# Patient Record
Sex: FEMALE | URBAN - METROPOLITAN AREA
[De-identification: names, ages, dates, MRNs, and addresses within clinical notes are randomized per-mention and may not be internally consistent; named-entity substitution may affect disease eponyms.]

---

## 2020-10-21 ENCOUNTER — OFFICE VISIT (OUTPATIENT)
Dept: URBAN - METROPOLITAN AREA CLINIC 27 | Facility: CLINIC | Age: 65
End: 2020-10-21
Payer: MEDICARE

## 2020-10-21 DIAGNOSIS — Z96.1 PRESENCE OF INTRAOCULAR LENS: ICD-10-CM

## 2020-10-21 PROCEDURE — 92134 CPTRZ OPH DX IMG PST SGM RTA: CPT | Performed by: OPHTHALMOLOGY

## 2020-10-21 PROCEDURE — 92014 COMPRE OPH EXAM EST PT 1/>: CPT | Performed by: OPHTHALMOLOGY

## 2020-10-21 ASSESSMENT — INTRAOCULAR PRESSURE
OD: 15
OS: 19

## 2020-10-21 NOTE — IMPRESSION/PLAN
Impression: Macular degeneration wet, OD. Status: Symptomatic.
s/p Avastin x 11 last 09/02/2020 (consented 03/06/2020) Plan: Exam and OCT reveal SRF and a PED. An IVFA from 09/02/2020 demonstrated leakage. Fundus photos from 09/02/2020 demonstrated drusen. An ICGA from 05/29/2020 demonstrated no polyps. Recommend Avastin. R/B/A discussed with patient. The risks of Avastin were discussed, including off-label use and compounding pharmacy risks, and the patient elects to proceed with Avastin (bilateral). The risks of bilateral injections were discussed, and the patient elects to proceed. After 2% Subconjunctival anesthesia & Betadine prep, 1.25mg of Avastin was injected in the eye. Cold compress and Tylenol suggested for pain if needed. Thanks, Bibi Richards. 

Return in 1 month OCT OU re eval Nv AMD.

## 2020-10-21 NOTE — IMPRESSION/PLAN
Impression: Macular degeneration wet, OS. Status: Symptomatic.
s/p Avastin x 11 last 09/02/2020 Plan: Exam and OCT reveal IRF and a PED OS. An IVFA from 09/02/2020 demonstrated leakage. Fundus photos from 09/02/2020 demonstrated drusen. An ICGA from 05/29/2020 demonstrated a polyp. Recommend Avastin. R/B/A discussed with patient. The risks of Avastin were discussed, including off-label use and compounding pharmacy risks, and the patient elects to proceed with Avastin (bilateral).

## 2020-11-18 ENCOUNTER — OFFICE VISIT (OUTPATIENT)
Dept: URBAN - METROPOLITAN AREA CLINIC 27 | Facility: CLINIC | Age: 65
End: 2020-11-18
Payer: MEDICARE

## 2020-11-18 DIAGNOSIS — H04.123 DRY EYE SYNDROME OF BILATERAL LACRIMAL GLANDS: ICD-10-CM

## 2020-11-18 PROCEDURE — 92014 COMPRE OPH EXAM EST PT 1/>: CPT | Performed by: OPHTHALMOLOGY

## 2020-11-18 PROCEDURE — 92134 CPTRZ OPH DX IMG PST SGM RTA: CPT | Performed by: OPHTHALMOLOGY

## 2020-11-18 ASSESSMENT — INTRAOCULAR PRESSURE
OD: 15
OS: 16

## 2020-11-18 NOTE — IMPRESSION/PLAN
Impression: Macular degeneration wet, OS. Status: Symptomatic.
s/p Avastin x 12 last 10/21/2020 Plan: Exam and OCT reveal IRF and a PED OS. An IVFA from 09/02/2020 demonstrated leakage. Fundus photos from 09/02/2020 demonstrated drusen. An ICGA from 05/29/2020 demonstrated a polyp. Recommend Avastin. R/B/A discussed with patient. The risks of Avastin were discussed, including off-label use and compounding pharmacy risks, and the patient elects to proceed with Avastin (bilateral).

## 2020-11-18 NOTE — IMPRESSION/PLAN
Impression: Macular degeneration wet, OD. Status: Symptomatic.
s/p Avastin x 12 last 10/21/2020 (consented 03/06/2020) Plan: The patient notes distortion. Exam and OCT reveal SRF and a PED. An IVFA from 09/02/2020 demonstrated leakage. Fundus photos from 09/02/2020 demonstrated drusen. An ICGA from 05/29/2020 demonstrated no polyps. Recommend Avastin. R/B/A discussed with patient. The risks of Avastin were discussed, including off-label use and compounding pharmacy risks, and the patient elects to proceed with Avastin (bilateral). The risks of bilateral injections were discussed, and the patient elects to proceed. After 2% Subconjunctival anesthesia & Betadine prep, 1.25mg of Avastin was injected in the eye. Cold compress and Tylenol suggested for pain if needed. Thanks, Davy Lancaster. 

Return in 1 month OCT OU re eval Nv AMD.

## 2021-01-22 ENCOUNTER — OFFICE VISIT (OUTPATIENT)
Dept: URBAN - METROPOLITAN AREA CLINIC 27 | Facility: CLINIC | Age: 66
End: 2021-01-22
Payer: MEDICARE

## 2021-01-22 PROCEDURE — 92014 COMPRE OPH EXAM EST PT 1/>: CPT | Performed by: OPHTHALMOLOGY

## 2021-01-22 PROCEDURE — 67028 INJECTION EYE DRUG: CPT | Performed by: OPHTHALMOLOGY

## 2021-01-22 PROCEDURE — 92134 CPTRZ OPH DX IMG PST SGM RTA: CPT | Performed by: OPHTHALMOLOGY

## 2021-01-22 ASSESSMENT — INTRAOCULAR PRESSURE
OS: 16
OD: 19

## 2021-01-22 NOTE — IMPRESSION/PLAN
Impression: Macular degeneration wet, OS. Status: Symptomatic.
s/p Avastin x 13 last 11/18/2020 Plan: Exam and OCT reveal IRF and a PED OS. An IVFA from 09/02/2020 demonstrated leakage. Fundus photos from 09/02/2020 demonstrated drusen. An ICGA from 05/29/2020 demonstrated a polyp. Recommend Avastin. R/B/A discussed with patient. The risks of Avastin were discussed, including off-label use and compounding pharmacy risks, and the patient elects to proceed with Avastin (bilateral).

## 2021-01-22 NOTE — IMPRESSION/PLAN
Impression: Macular degeneration wet, OD. Status: Symptomatic.
s/p Avastin x 13 last 11/18/2020 (consented 03/06/2020) Plan: The patient notes distortion. Exam and OCT reveal SRF and a PED. An IVFA from 09/02/2020 demonstrated leakage. Fundus photos from 09/02/2020 demonstrated drusen. An ICGA from 05/29/2020 demonstrated no polyps. Recommend Avastin. R/B/A discussed with patient. The risks of Avastin were discussed, including off-label use and compounding pharmacy risks, and the patient elects to proceed with Avastin (bilateral). The risks of bilateral injections were discussed, and the patient elects to proceed. After 2% Subconjunctival anesthesia & Betadine prep, 1.25mg of Avastin was injected in the eye. Cold compress and Tylenol suggested for pain if needed. Thanks, Katharine Shultz. 

Return in 1 month OCT OU re eval Nv AMD, possible Lucentis OU Albin Arrieta)

## 2021-03-31 ENCOUNTER — OFFICE VISIT (OUTPATIENT)
Dept: URBAN - METROPOLITAN AREA CLINIC 27 | Facility: CLINIC | Age: 66
End: 2021-03-31
Payer: MEDICARE

## 2021-03-31 PROCEDURE — 99214 OFFICE O/P EST MOD 30 MIN: CPT | Performed by: OPHTHALMOLOGY

## 2021-03-31 PROCEDURE — 92134 CPTRZ OPH DX IMG PST SGM RTA: CPT | Performed by: OPHTHALMOLOGY

## 2021-03-31 ASSESSMENT — INTRAOCULAR PRESSURE
OD: 16
OS: 17

## 2021-03-31 NOTE — IMPRESSION/PLAN
Impression: Macular degeneration wet, OD. Status: Symptomatic.
s/p Avastin x 14 last 01/22/2021 (consented 03/06/2020) Plan: The patient notes worsening distortion. Exam and OCT reveal SRF and a PED. An IVFA from 09/02/2020 demonstrated leakage. Fundus photos from 09/02/2020 demonstrated drusen. An ICGA from 05/29/2020 demonstrated no polyps. Recommend Lucentis. R/B/A discussed with patient. Patient elects to proceed with Lucentis 0.5mg (bilateral) today. The risks of bilateral injections were discussed, and the patient elects to proceed. After 2% Subconjunctival anesthesia & betadine prep, Lucentis was injected in the eye with no complications. Overfill discarded. Cold compress and Tylenol suggested for pain if needed. 

Return in 1 month OCT OU re eval Nv AMD, possible Lucentis OU, ICG OD 1st

## 2021-03-31 NOTE — IMPRESSION/PLAN
Impression: Macular degeneration wet, OS. Status: Symptomatic.
s/p Avastin x 14 last 01/22/2021  Plan: Exam and OCT reveal IRF and a PED OS. An IVFA from 09/02/2020 demonstrated leakage. Fundus photos from 09/02/2020 demonstrated drusen. An ICGA from 05/29/2020 demonstrated a polyp. Recommend Lucentis. R/B/A discussed with patient. Patient elects to proceed with Lucentis 0.5mg today. After 2% Subconjunctival anesthesia & betadine prep, Lucentis was injected in the eye with no complications. Remainder discarded. Cold compress and Tylenol suggested for pain if needed.

## 2021-04-28 ENCOUNTER — OFFICE VISIT (OUTPATIENT)
Dept: URBAN - METROPOLITAN AREA CLINIC 27 | Facility: CLINIC | Age: 66
End: 2021-04-28
Payer: MEDICARE

## 2021-04-28 DIAGNOSIS — H35.3231 EXUDATIVE AGE-REL MCLR DEGN, BI, WITH ACTV CHRDL NEOVAS: Primary | ICD-10-CM

## 2021-04-28 PROCEDURE — 92134 CPTRZ OPH DX IMG PST SGM RTA: CPT | Performed by: OPHTHALMOLOGY

## 2021-04-28 PROCEDURE — 92240 ICG ANGIOGRAPHY I&R UNI/BI: CPT | Performed by: OPHTHALMOLOGY

## 2021-04-28 PROCEDURE — 99214 OFFICE O/P EST MOD 30 MIN: CPT | Performed by: OPHTHALMOLOGY

## 2021-04-28 ASSESSMENT — INTRAOCULAR PRESSURE
OS: 21
OD: 19

## 2021-04-28 NOTE — IMPRESSION/PLAN
Impression: Macular degeneration wet, OD. Status: Symptomatic.
s/p Avastin x 14 last 01/22/2021 (consented 03/06/2020)
s/p Lucentis x last 03/31/2021  Plan: Exam and OCT reveal SRF and a PED. An IVFA from 09/02/2020 demonstrated leakage. Fundus photos from 04/28/2021 demonstrated drusen. An ICGA from 04/28/2021 demonstrated no polyps. Recommend Lucentis. R/B/A discussed with patient. Patient elects to proceed with Lucentis 0.5mg (bilateral) today. The risks of bilateral injections were discussed, and the patient elects to proceed. After 2% Subconjunctival anesthesia & betadine prep, Lucentis was injected in the eye with no complications. Overfill discarded. Cold compress and Tylenol suggested for pain if needed. 

Return in 1 month OCT OU re eval Nv AMD, possible Lucentis OU, IVFA OD 1st

## 2021-04-28 NOTE — IMPRESSION/PLAN
Impression: Macular degeneration wet, OS. Status: Symptomatic.
s/p Avastin x 14 last 01/22/2021
s/p Lucentis x last 03/31/2021  Plan: Exam and OCT reveal IRF and a PED OS. An IVFA from 09/02/2020 demonstrated leakage. Fundus photos from 04/28/2021 demonstrated drusen. An ICGA from 04/28/2021 demonstrated a polyp. Recommend Lucentis. R/B/A discussed with patient. Patient elects to proceed with Lucentis 0.5mg today. After 2% Subconjunctival anesthesia & betadine prep, Lucentis was injected in the eye with no complications. Remainder discarded. Cold compress and Tylenol suggested for pain if needed.

## 2021-05-28 ENCOUNTER — OFFICE VISIT (OUTPATIENT)
Dept: URBAN - METROPOLITAN AREA CLINIC 27 | Facility: CLINIC | Age: 66
End: 2021-05-28
Payer: MEDICARE

## 2021-05-28 DIAGNOSIS — H43.813 VITREOUS DEGENERATION, BILATERAL: ICD-10-CM

## 2021-05-28 PROCEDURE — 99214 OFFICE O/P EST MOD 30 MIN: CPT | Performed by: OPHTHALMOLOGY

## 2021-05-28 PROCEDURE — 92235 FLUORESCEIN ANGRPH MLTIFRAME: CPT | Performed by: OPHTHALMOLOGY

## 2021-05-28 PROCEDURE — 92134 CPTRZ OPH DX IMG PST SGM RTA: CPT | Performed by: OPHTHALMOLOGY

## 2021-05-28 ASSESSMENT — INTRAOCULAR PRESSURE
OD: 17
OS: 22

## 2021-05-28 NOTE — IMPRESSION/PLAN
Impression: Macular degeneration wet, OD. Status: Symptomatic.
s/p Avastin x 14 last 01/22/2021 
s/p Lucentis x last 04/28/2021  Plan: The patient notes blurring. Exam and OCT reveal SRF and a PED. An IVFA from 05/28/2021 demonstrated leakage. Fundus photos from 05/28/2021 demonstrated drusen. An ICGA from 04/28/2021 demonstrated no polyps. Recommend Lucentis. R/B/A discussed with patient. Patient elects to proceed with Lucentis 0.5mg (bilateral) today. The risks of bilateral injections were discussed, and the patient elects to proceed. After 2% Subconjunctival anesthesia & betadine prep, Lucentis was injected in the eye with no complications. Overfill discarded. Cold compress and Tylenol suggested for pain if needed. 

Return in 1 month OCT OU re eval Nv AMD, possible Lucentis OU

## 2021-05-28 NOTE — IMPRESSION/PLAN
Impression: Macular degeneration wet, OS. Status: Symptomatic.
s/p Avastin x 14 last 01/22/2021
s/p Lucentis x last 04/28/2021  Plan: Exam and OCT reveal IRF and a PED OS. An IVFA from 05/28/2021 demonstrated leakage. Fundus photos from 05/28/2021 demonstrated drusen. An ICGA from 04/28/2021 demonstrated a polyp. Recommend Lucentis. R/B/A discussed with patient. Patient elects to proceed with Lucentis 0.5mg today. After 2% Subconjunctival anesthesia & betadine prep, Lucentis was injected in the eye with no complications. Remainder discarded. Cold compress and Tylenol suggested for pain if needed.

## 2021-08-04 ENCOUNTER — OFFICE VISIT (OUTPATIENT)
Dept: URBAN - METROPOLITAN AREA CLINIC 27 | Facility: CLINIC | Age: 66
End: 2021-08-04
Payer: MEDICARE

## 2021-08-04 PROCEDURE — 92014 COMPRE OPH EXAM EST PT 1/>: CPT | Performed by: OPHTHALMOLOGY

## 2021-08-04 PROCEDURE — 92134 CPTRZ OPH DX IMG PST SGM RTA: CPT | Performed by: OPHTHALMOLOGY

## 2021-08-04 ASSESSMENT — INTRAOCULAR PRESSURE
OS: 17
OD: 18

## 2021-08-04 NOTE — IMPRESSION/PLAN
Impression: Macular degeneration wet, OD. Status: Symptomatic.
s/p Avastin x 14 last 01/22/2021 
s/p Lucentis x last 05/28/2021 Plan: The patient notes continued blurring. Exam and OCT reveal SRF and a PED. An IVFA from 05/28/2021 demonstrated leakage. Fundus photos from 05/28/2021 demonstrated drusen. An ICGA from 04/28/2021 demonstrated no polyps. Recommend Lucentis. R/B/A discussed with patient. Patient elects to proceed with Lucentis 0.5mg (bilateral) today. The risks of bilateral injections were discussed, and the patient elects to proceed. After 2% Subconjunctival anesthesia & betadine prep, Lucentis was injected in the eye with no complications. Overfill discarded. Cold compress and Tylenol suggested for pain if needed. 

Return in 1 month OCT OU re eval Nv AMD, possible Lucentis OU

## 2021-08-04 NOTE — IMPRESSION/PLAN
Impression: Macular degeneration wet, OS. Status: Symptomatic.
s/p Avastin x 14 last 01/22/2021
s/p Lucentis x last 05/28/2021 Plan: Exam and OCT reveal IRF and a PED OS. An IVFA from 05/28/2021 demonstrated leakage. Fundus photos from 05/28/2021 demonstrated drusen. An ICGA from 04/28/2021 demonstrated a polyp. Recommend Lucentis. R/B/A discussed with patient. Patient elects to proceed with Lucentis 0.5mg today. After 2% Subconjunctival anesthesia & betadine prep, Lucentis was injected in the eye with no complications. Remainder discarded. Cold compress and Tylenol suggested for pain if needed.

## 2021-09-17 ENCOUNTER — OFFICE VISIT (OUTPATIENT)
Dept: URBAN - METROPOLITAN AREA CLINIC 27 | Facility: CLINIC | Age: 66
End: 2021-09-17
Payer: MEDICARE

## 2021-09-17 PROCEDURE — 92014 COMPRE OPH EXAM EST PT 1/>: CPT | Performed by: OPHTHALMOLOGY

## 2021-09-17 PROCEDURE — 92134 CPTRZ OPH DX IMG PST SGM RTA: CPT | Performed by: OPHTHALMOLOGY

## 2021-09-17 ASSESSMENT — INTRAOCULAR PRESSURE
OD: 22
OS: 20

## 2021-09-17 NOTE — IMPRESSION/PLAN
Impression: Macular degeneration wet, OS. Status: Symptomatic.
s/p Avastin x 14 last 01/22/2021
s/p Lucentis x last 08/04/2021  Plan: Exam and OCT reveal IRF and a PED OS. An IVFA from 05/28/2021 demonstrated leakage. Fundus photos from 05/28/2021 demonstrated drusen. An ICGA from 04/28/2021 demonstrated a polyp. Recommend Lucentis. R/B/A discussed with patient. Patient elects to proceed with Lucentis 0.5mg today. After 2% Subconjunctival anesthesia & betadine prep, Lucentis was injected in the eye with no complications. Remainder discarded. Cold compress and Tylenol suggested for pain if needed.

## 2021-09-17 NOTE — IMPRESSION/PLAN
Impression: Macular degeneration wet, OD. Status: Symptomatic.
s/p Avastin x 14 last 01/22/2021 
s/p Lucentis x last 08/04/2021  Plan: Exam and OCT reveal SRF and a PED. An IVFA from 05/28/2021 demonstrated leakage. Fundus photos from 05/28/2021 demonstrated drusen. An ICGA from 04/28/2021 demonstrated no polyps. Recommend Lucentis. R/B/A discussed with patient. Patient elects to proceed with Lucentis 0.5mg (bilateral) today. The risks of bilateral injections were discussed, and the patient elects to proceed. After 2% Subconjunctival anesthesia & betadine prep, Lucentis was injected in the eye with no complications. Overfill discarded. Cold compress and Tylenol suggested for pain if needed. Will consider Avastin. 

Return in 1 month OCT OU re eval Nv AMD, possible Avastin OU Moises Nichole)

## 2021-12-08 ENCOUNTER — OFFICE VISIT (OUTPATIENT)
Dept: URBAN - METROPOLITAN AREA CLINIC 27 | Facility: CLINIC | Age: 66
End: 2021-12-08
Payer: MEDICARE

## 2021-12-08 PROCEDURE — 92134 CPTRZ OPH DX IMG PST SGM RTA: CPT | Performed by: OPHTHALMOLOGY

## 2021-12-08 PROCEDURE — 92014 COMPRE OPH EXAM EST PT 1/>: CPT | Performed by: OPHTHALMOLOGY

## 2021-12-08 ASSESSMENT — INTRAOCULAR PRESSURE
OS: 21
OD: 20

## 2021-12-08 NOTE — IMPRESSION/PLAN
Impression: Macular degeneration wet, OS. Status: Symptomatic.
s/p Avastin x 14 last 01/22/2021
s/p Lucentis x last 09/17/2021 Plan: Exam and OCT reveal IRF and a PED OS. An IVFA from 05/28/2021 demonstrated leakage. Fundus photos from 05/28/2021 demonstrated drusen. An ICGA from 04/28/2021 demonstrated a polyp. Recommend Avastin. R/B/A discussed with patient. The risks of Avastin were discussed, including off-label use and compounding pharmacy risks, and the patient elects to proceed with Avastin (bilateral). The risks of bilateral injections were discussed, and the patient elects to proceed. After 2% Subconjunctival anesthesia & betadine prep, 1.25mg of Avastin was injected in the eye. Cold compress and Tylenol suggested for pain if needed.

## 2021-12-08 NOTE — IMPRESSION/PLAN
Impression: Macular degeneration wet, OD. Status: Symptomatic.
s/p Avastin x 14 last 01/22/2021 
s/p Lucentis x last 09/17/2021 Plan: The patient notes blurring. Exam and OCT reveal SRF and a PED. An IVFA from 05/28/2021 demonstrated leakage. Fundus photos from 05/28/2021 demonstrated drusen. An ICGA from 04/28/2021 demonstrated no polyps. Recommend Avastin. R/B/A discussed with patient. The risks of Avastin were discussed, including off-label use and compounding pharmacy risks, and the patient elects to proceed with Avastin (bilateral). The risks of bilateral injections were discussed, and the patient elects to proceed. After 2% Subconjunctival anesthesia & betadine prep, 1.25mg of Avastin was injected in the eye. Cold compress and Tylenol suggested for pain if needed. 


Return in 1 month OCT OU re eval Nv AMD, possible Avastin OU, ICG OD 1st

## 2022-01-19 ENCOUNTER — OFFICE VISIT (OUTPATIENT)
Dept: URBAN - METROPOLITAN AREA CLINIC 27 | Facility: CLINIC | Age: 67
End: 2022-01-19
Payer: MEDICARE

## 2022-01-19 PROCEDURE — 92240 ICG ANGIOGRAPHY I&R UNI/BI: CPT | Performed by: OPHTHALMOLOGY

## 2022-01-19 PROCEDURE — 92134 CPTRZ OPH DX IMG PST SGM RTA: CPT | Performed by: OPHTHALMOLOGY

## 2022-01-19 PROCEDURE — 92014 COMPRE OPH EXAM EST PT 1/>: CPT | Performed by: OPHTHALMOLOGY

## 2022-01-19 ASSESSMENT — INTRAOCULAR PRESSURE
OD: 20
OS: 18

## 2022-01-19 NOTE — IMPRESSION/PLAN
Impression: Macular degeneration wet, OD. Status: Symptomatic.
s/p Avastin x 15 last 12/18/2021
s/p Lucentis x last 09/17/2021 Plan: The patient notes continued blurring. Exam and OCT reveal SRF and a PED. An IVFA from 05/28/2021 demonstrated leakage. Fundus photos from 05/28/2021 demonstrated drusen. An ICGA from 01/19/2022 demonstrated no polyps. Recommend Avastin. R/B/A discussed with patient. The risks of Avastin were discussed, including off-label use and compounding pharmacy risks, and the patient elects to proceed with Avastin (bilateral). The risks of bilateral injections were discussed, and the patient elects to proceed. After 2% Subconjunctival anesthesia & betadine prep, 1.25mg of Avastin was injected in the eye. Cold compress and Tylenol suggested for pain if needed. 

Return in 1 month OCT OU re eval Nv AMD, possible Avastin OU, IVFA OD 1st

## 2022-01-19 NOTE — IMPRESSION/PLAN
Impression: Macular degeneration wet, OS. Status: Symptomatic.
s/p Avastin x 15 last 12/08/2021
s/p Lucentis x last 09/17/2021 Plan: Exam and OCT reveal IRF and a PED OS. An IVFA from 05/28/2021 demonstrated leakage. Fundus photos from 05/28/2021 demonstrated drusen. An ICGA from 01/19/2022 demonstrated a polyp. Recommend Avastin. R/B/A discussed with patient. The risks of Avastin were discussed, including off-label use and compounding pharmacy risks, and the patient elects to proceed with Avastin (bilateral). The risks of bilateral injections were discussed, and the patient elects to proceed. After 2% Subconjunctival anesthesia & betadine prep, 1.25mg of Avastin was injected in the eye. Cold compress and Tylenol suggested for pain if needed.

## 2022-02-16 ENCOUNTER — OFFICE VISIT (OUTPATIENT)
Dept: URBAN - METROPOLITAN AREA CLINIC 27 | Facility: CLINIC | Age: 67
End: 2022-02-16
Payer: MEDICARE

## 2022-02-16 PROCEDURE — 92014 COMPRE OPH EXAM EST PT 1/>: CPT | Performed by: OPHTHALMOLOGY

## 2022-02-16 PROCEDURE — 92235 FLUORESCEIN ANGRPH MLTIFRAME: CPT | Performed by: OPHTHALMOLOGY

## 2022-02-16 PROCEDURE — 92134 CPTRZ OPH DX IMG PST SGM RTA: CPT | Performed by: OPHTHALMOLOGY

## 2022-02-16 ASSESSMENT — INTRAOCULAR PRESSURE
OS: 18
OD: 18

## 2022-02-16 NOTE — IMPRESSION/PLAN
Impression: Macular degeneration wet, OD. Status: Symptomatic.
s/p Avastin x 16 last 01/19/2022 
s/p Lucentis x last 09/17/2021 Plan: Exam and OCT reveal SRF and a PED. An IVFA from 02/16/2022 demonstrated leakage. Fundus photos from 02/16/2022 demonstrated drusen. An ICGA from 01/19/2022 demonstrated no polyps. Recommend Avastin. R/B/A discussed with patient. The risks of Avastin were discussed, including off-label use and compounding pharmacy risks, and the patient elects to proceed with Avastin (bilateral). The risks of bilateral injections were discussed, and the patient elects to proceed. After 2% Subconjunctival anesthesia & betadine prep, 1.25mg of Avastin was injected in the eye. Cold compress and Tylenol suggested for pain if needed. 

Return in 1 month OCT OU re eval Nv AMD, possible Avastin OU

## 2022-02-16 NOTE — IMPRESSION/PLAN
Impression: Macular degeneration wet, OS. Status: Symptomatic.
s/p Avastin x 16 last 01/19/2022
s/p Lucentis x last 09/17/2021 Plan: Exam and OCT reveal IRF and a PED OS. An IVFA from 2/16/2022 demonstrated leakage. Fundus photos from 2/16/2022 demonstrated drusen. An ICGA from 01/19/2022 demonstrated a polyp. Recommend Avastin. R/B/A discussed with patient. The risks of Avastin were discussed, including off-label use and compounding pharmacy risks, and the patient elects to proceed with Avastin (bilateral). The risks of bilateral injections were discussed, and the patient elects to proceed. After 2% Subconjunctival anesthesia & betadine prep, 1.25mg of Avastin was injected in the eye. Cold compress and Tylenol suggested for pain if needed.

## 2022-04-29 ENCOUNTER — OFFICE VISIT (OUTPATIENT)
Dept: URBAN - METROPOLITAN AREA CLINIC 27 | Facility: CLINIC | Age: 67
End: 2022-04-29
Payer: MEDICARE

## 2022-04-29 DIAGNOSIS — H35.3231 EXUDATIVE AGE-RELATED MACULAR DEGENERATION, BILATERAL, WITH ACTIVE CHOROIDAL NEOVASCULARIZATION: Primary | ICD-10-CM

## 2022-04-29 PROCEDURE — 99214 OFFICE O/P EST MOD 30 MIN: CPT | Performed by: OPHTHALMOLOGY

## 2022-04-29 PROCEDURE — 92134 CPTRZ OPH DX IMG PST SGM RTA: CPT | Performed by: OPHTHALMOLOGY

## 2022-04-29 ASSESSMENT — INTRAOCULAR PRESSURE
OS: 16
OD: 13

## 2022-04-29 NOTE — IMPRESSION/PLAN
Impression: Macular degeneration wet, OD. Status: Symptomatic.
s/p Avastin x 17 last 02/16/2022 
s/p Lucentis x last 09/17/2021 Plan: The patient notes blurring. Exam and OCT reveal SRF and a PED. An IVFA from 02/16/2022 demonstrated leakage. Fundus photos from 02/16/2022 demonstrated drusen. An ICGA from 01/19/2022 demonstrated no polyps. Recommend Avastin. R/B/A discussed with patient. The risks of Avastin were discussed, including off-label use and compounding pharmacy risks, and the patient elects to proceed with Avastin (bilateral). The risks of bilateral injections were discussed, and the patient elects to proceed. After 2% Subconjunctival anesthesia & betadine prep, 1.25mg of Avastin was injected in the eye. Cold compress and Tylenol suggested for pain if needed. 

Return in 1 month OCT OU re eval Nv AMD, possible Avastin OU

## 2022-04-29 NOTE — IMPRESSION/PLAN
Impression: Macular degeneration wet, OS. Status: Symptomatic.
s/p Avastin x 17  last 02/16/2022 
s/p Lucentis x last 09/17/2021 Plan: Exam and OCT reveal IRF and a PED OS. An IVFA from 2/16/2022 demonstrated leakage. Fundus photos from 2/16/2022 demonstrated drusen. An ICGA from 01/19/2022 demonstrated a polyp. Recommend Avastin. R/B/A discussed with patient. The risks of Avastin were discussed, including off-label use and compounding pharmacy risks, and the patient elects to proceed with Avastin (bilateral). The risks of bilateral injections were discussed, and the patient elects to proceed. After 2% Subconjunctival anesthesia & betadine prep, 1.25mg of Avastin was injected in the eye. Cold compress and Tylenol suggested for pain if needed.

## 2022-05-27 ENCOUNTER — OFFICE VISIT (OUTPATIENT)
Dept: URBAN - METROPOLITAN AREA CLINIC 27 | Facility: CLINIC | Age: 67
End: 2022-05-27
Payer: MEDICARE

## 2022-05-27 DIAGNOSIS — H04.123 DRY EYE SYNDROME OF BILATERAL LACRIMAL GLANDS: ICD-10-CM

## 2022-05-27 DIAGNOSIS — Z96.1 PRESENCE OF INTRAOCULAR LENS: ICD-10-CM

## 2022-05-27 DIAGNOSIS — H43.813 VITREOUS DEGENERATION, BILATERAL: ICD-10-CM

## 2022-05-27 PROCEDURE — 99214 OFFICE O/P EST MOD 30 MIN: CPT | Performed by: OPHTHALMOLOGY

## 2022-05-27 PROCEDURE — 92134 CPTRZ OPH DX IMG PST SGM RTA: CPT | Performed by: OPHTHALMOLOGY

## 2022-05-27 ASSESSMENT — INTRAOCULAR PRESSURE
OD: 19
OS: 19

## 2022-05-27 NOTE — IMPRESSION/PLAN
Impression: Macular degeneration wet, OS. Status: Symptomatic.
s/p Avastin x 18 last 04/29/2022 
s/p Lucentis x last 09/17/2021 Plan: Exam and OCT reveal IRF and a PED OS. An IVFA from 2/16/2022 demonstrated leakage. Fundus photos from 2/16/2022 demonstrated drusen. An ICGA from 01/19/2022 demonstrated a polyp. Recommend Avastin. R/B/A discussed with patient. The risks of Avastin were discussed, including off-label use and compounding pharmacy risks, and the patient elects to proceed with Avastin (bilateral). The risks of bilateral injections were discussed, and the patient elects to proceed. After 2% Subconjunctival anesthesia & betadine prep, 1.25mg of Avastin was injected in the eye. Cold compress and Tylenol suggested for pain if needed.

## 2022-05-27 NOTE — IMPRESSION/PLAN
Impression: Macular degeneration wet, OD. Status: Symptomatic.
s/p Avastin x 18 last 04/29/2022 
s/p Lucentis x last 09/17/2021 Plan: The patient notes continued blurring. Exam and OCT reveal SRF and a PED. An IVFA from 02/16/2022 demonstrated leakage. Fundus photos from 02/16/2022 demonstrated drusen. An ICGA from 01/19/2022 demonstrated no polyps. Recommend Avastin. R/B/A discussed with patient. The risks of Avastin were discussed, including off-label use and compounding pharmacy risks, and the patient elects to proceed with Avastin (bilateral). The risks of bilateral injections were discussed, and the patient elects to proceed. After 2% Subconjunctival anesthesia & betadine prep, 1.25mg of Avastin was injected in the eye. Cold compress and Tylenol suggested for pain if needed. 

Return in 1 month OCT OU re eval Nv AMD, possible Avastin OU

## 2022-08-17 ENCOUNTER — OFFICE VISIT (OUTPATIENT)
Dept: URBAN - METROPOLITAN AREA CLINIC 27 | Facility: CLINIC | Age: 67
End: 2022-08-17
Payer: MEDICARE

## 2022-08-17 DIAGNOSIS — Z96.1 PRESENCE OF INTRAOCULAR LENS: ICD-10-CM

## 2022-08-17 DIAGNOSIS — H43.813 VITREOUS DEGENERATION, BILATERAL: ICD-10-CM

## 2022-08-17 DIAGNOSIS — H04.123 DRY EYE SYNDROME OF BILATERAL LACRIMAL GLANDS: ICD-10-CM

## 2022-08-17 DIAGNOSIS — H35.3231 EXUDATIVE AGE-REL MCLR DEGN, BI, WITH ACTV CHRDL NEOVAS: Primary | ICD-10-CM

## 2022-08-17 PROCEDURE — 92134 CPTRZ OPH DX IMG PST SGM RTA: CPT | Performed by: OPHTHALMOLOGY

## 2022-08-17 PROCEDURE — 99214 OFFICE O/P EST MOD 30 MIN: CPT | Performed by: OPHTHALMOLOGY

## 2022-08-17 ASSESSMENT — INTRAOCULAR PRESSURE
OD: 29
OS: 26

## 2022-08-17 NOTE — IMPRESSION/PLAN
Impression: Macular degeneration wet, OD. Status: Symptomatic.
s/p Avastin x 19 last 05/27/2022 
s/p Lucentis x last 09/17/2021 Plan: Exam and OCT reveal SRF and a PED. An IVFA from 02/16/2022 demonstrated leakage. Fundus photos from 02/16/2022 demonstrated drusen. An ICGA from 01/19/2022 demonstrated no polyps. Recommend Avastin. R/B/A discussed with patient. The risks of Avastin were discussed, including off-label use and compounding pharmacy risks, and the patient elects to proceed with Avastin (bilateral). The risks of bilateral injections were discussed, and the patient elects to proceed. After 2% Subconjunctival anesthesia & betadine prep, 1.25mg of Avastin was injected in the eye. Cold compress and Tylenol suggested for pain if needed. 

Return in 1 month OCT OU re eval Nv AMD, possible Avastin OU, ICG OD 1st

## 2022-08-17 NOTE — IMPRESSION/PLAN
Impression: Macular degeneration wet, OS. Status: Symptomatic.
s/p Avastin x 19 last 05/27/2022 
s/p Lucentis x last 09/17/2021 Plan: Exam and OCT reveal IRF and a PED OS. An IVFA from 2/16/2022 demonstrated leakage. Fundus photos from 2/16/2022 demonstrated drusen. An ICGA from 01/19/2022 demonstrated a polyp. Recommend Avastin. R/B/A discussed with patient. The risks of Avastin were discussed, including off-label use and compounding pharmacy risks, and the patient elects to proceed with Avastin (bilateral). The risks of bilateral injections were discussed, and the patient elects to proceed. After 2% Subconjunctival anesthesia & betadine prep, 1.25mg of Avastin was injected in the eye. Cold compress and Tylenol suggested for pain if needed.

## 2022-09-14 ENCOUNTER — OFFICE VISIT (OUTPATIENT)
Dept: URBAN - METROPOLITAN AREA CLINIC 27 | Facility: CLINIC | Age: 67
End: 2022-09-14
Payer: MEDICARE

## 2022-09-14 DIAGNOSIS — H35.3231 EXUDATIVE AGE-REL MCLR DEGN, BI, WITH ACTV CHRDL NEOVAS: Primary | ICD-10-CM

## 2022-09-14 DIAGNOSIS — H43.813 VITREOUS DEGENERATION, BILATERAL: ICD-10-CM

## 2022-09-14 DIAGNOSIS — H04.123 DRY EYE SYNDROME OF BILATERAL LACRIMAL GLANDS: ICD-10-CM

## 2022-09-14 DIAGNOSIS — Z96.1 PRESENCE OF INTRAOCULAR LENS: ICD-10-CM

## 2022-09-14 PROCEDURE — 92014 COMPRE OPH EXAM EST PT 1/>: CPT | Performed by: OPHTHALMOLOGY

## 2022-09-14 PROCEDURE — 67028 INJECTION EYE DRUG: CPT | Performed by: OPHTHALMOLOGY

## 2022-09-14 PROCEDURE — 92134 CPTRZ OPH DX IMG PST SGM RTA: CPT | Performed by: OPHTHALMOLOGY

## 2022-09-14 ASSESSMENT — INTRAOCULAR PRESSURE
OS: 25
OD: 18

## 2022-09-14 NOTE — IMPRESSION/PLAN
Impression: Macular degeneration wet, OD. Status: Symptomatic.
s/p Avastin x 20 last 08/17/2022 
s/p Lucentis x last 09/17/2021 Plan: The patient notes blurring. Exam and OCT reveal SRF and a PED. An IVFA from 02/16/2022 demonstrated leakage. Fundus photos from 02/16/2022 demonstrated drusen. An ICGA from 01/19/2022 demonstrated no polyps. Recommend Avastin. R/B/A discussed with patient. The risks of Avastin were discussed, including off-label use and compounding pharmacy risks, and the patient elects to proceed with Avastin (bilateral). The risks of bilateral injections were discussed, and the patient elects to proceed. After 2% Subconjunctival anesthesia & betadine prep, 1.25mg of Avastin was injected in the eye. Cold compress and Tylenol suggested for pain if needed. 

Return in 1 month OCT OU re eval Nv AMD, possible Avastin OU, ICG OD 1st

## 2022-09-14 NOTE — IMPRESSION/PLAN
Impression: Macular degeneration wet, OS. Status: Symptomatic.
s/p Avastin x 20 last 08/17/2022 
s/p Lucentis x last 09/17/2021 Plan: Exam and OCT reveal IRF and a PED OS. An IVFA from 2/16/2022 demonstrated leakage. Fundus photos from 2/16/2022 demonstrated drusen. An ICGA from 01/19/2022 demonstrated a polyp. Recommend Avastin. R/B/A discussed with patient. The risks of Avastin were discussed, including off-label use and compounding pharmacy risks, and the patient elects to proceed with Avastin (bilateral). The risks of bilateral injections were discussed, and the patient elects to proceed. After 2% Subconjunctival anesthesia & betadine prep, 1.25mg of Avastin was injected in the eye. Cold compress and Tylenol suggested for pain if needed.

## 2022-12-07 ENCOUNTER — OFFICE VISIT (OUTPATIENT)
Dept: URBAN - METROPOLITAN AREA CLINIC 27 | Facility: CLINIC | Age: 67
End: 2022-12-07
Payer: MEDICARE

## 2022-12-07 DIAGNOSIS — H04.123 DRY EYE SYNDROME OF BILATERAL LACRIMAL GLANDS: ICD-10-CM

## 2022-12-07 DIAGNOSIS — Z96.1 PRESENCE OF INTRAOCULAR LENS: ICD-10-CM

## 2022-12-07 DIAGNOSIS — H35.3231 EXUDATIVE AGE-RELATED MACULAR DEGENERATION, BILATERAL, WITH ACTIVE CHOROIDAL NEOVASCULARIZATION: Primary | ICD-10-CM

## 2022-12-07 DIAGNOSIS — H43.813 VITREOUS DEGENERATION, BILATERAL: ICD-10-CM

## 2022-12-07 PROCEDURE — 92014 COMPRE OPH EXAM EST PT 1/>: CPT | Performed by: OPHTHALMOLOGY

## 2022-12-07 PROCEDURE — 92240 ICG ANGIOGRAPHY I&R UNI/BI: CPT | Performed by: OPHTHALMOLOGY

## 2022-12-07 PROCEDURE — 92134 CPTRZ OPH DX IMG PST SGM RTA: CPT | Performed by: OPHTHALMOLOGY

## 2022-12-07 ASSESSMENT — INTRAOCULAR PRESSURE
OD: 15
OS: 11

## 2022-12-07 NOTE — IMPRESSION/PLAN
Impression: Macular degeneration wet, OS. Status: Symptomatic.
s/p Avastin x 21 last 09/14/2022 
s/p Lucentis x last 09/17/2021 Plan: Exam and OCT reveal IRF and a PED OS. An IVFA from 2/16/2022 demonstrated leakage. Fundus photos from 2/16/2022 demonstrated drusen. An ICGA from 12/07/2022 demonstrated a polyp. Recommend Avastin. R/B/A discussed with patient. The risks of Avastin were discussed, including off-label use and compounding pharmacy risks, and the patient elects to proceed with Avastin (bilateral). The risks of bilateral injections were discussed, and the patient elects to proceed. After 2% Subconjunctival anesthesia & betadine prep, 1.25mg of Avastin was injected in the eye. Cold compress and Tylenol suggested for pain if needed.

## 2022-12-07 NOTE — IMPRESSION/PLAN
Impression: Macular degeneration wet, OD. Status: Symptomatic.
s/p Avastin x 21 last 09/14/2022 
s/p Lucentis x last 09/17/2021 Plan: The patient notes continued blurring. Exam and OCT reveal SRF and a PED. An IVFA from 02/16/2022 demonstrated leakage. Fundus photos from 02/16/2022 demonstrated drusen. An ICGA from 12/07/2022 demonstrated no polyps. Recommend Avastin. R/B/A discussed with patient. The risks of Avastin were discussed, including off-label use and compounding pharmacy risks, and the patient elects to proceed with Avastin (bilateral). The risks of bilateral injections were discussed, and the patient elects to proceed. After 2% Subconjunctival anesthesia & betadine prep, 1.25mg of Avastin was injected in the eye. Cold compress and Tylenol suggested for pain if needed. 

Return in 1 month OCT OU re eval Nv AMD, possible Avastin OU, IVFA OD 1st

## 2023-01-25 ENCOUNTER — OFFICE VISIT (OUTPATIENT)
Dept: URBAN - METROPOLITAN AREA CLINIC 27 | Facility: CLINIC | Age: 68
End: 2023-01-25
Payer: MEDICARE

## 2023-01-25 DIAGNOSIS — Z96.1 PRESENCE OF INTRAOCULAR LENS: ICD-10-CM

## 2023-01-25 DIAGNOSIS — H43.813 VITREOUS DEGENERATION, BILATERAL: ICD-10-CM

## 2023-01-25 DIAGNOSIS — H35.3231 EXUDATIVE AGE-REL MCLR DEGN, BI, WITH ACTV CHRDL NEOVAS: Primary | ICD-10-CM

## 2023-01-25 DIAGNOSIS — H04.123 DRY EYE SYNDROME OF BILATERAL LACRIMAL GLANDS: ICD-10-CM

## 2023-01-25 PROCEDURE — 92014 COMPRE OPH EXAM EST PT 1/>: CPT | Performed by: OPHTHALMOLOGY

## 2023-01-25 PROCEDURE — 92134 CPTRZ OPH DX IMG PST SGM RTA: CPT | Performed by: OPHTHALMOLOGY

## 2023-01-25 PROCEDURE — 92235 FLUORESCEIN ANGRPH MLTIFRAME: CPT | Performed by: OPHTHALMOLOGY

## 2023-01-25 ASSESSMENT — INTRAOCULAR PRESSURE
OS: 17
OD: 16

## 2023-01-25 NOTE — IMPRESSION/PLAN
Impression: Macular degeneration wet, OD. Status: Symptomatic.
s/p Avastin x 22 last 12/07/2022
s/p Lucentis x last 09/17/2021 Plan: Exam and OCT reveal SRF and a PED. An IVFA from 01/25/2023 demonstrated leakage. Fundus photos from 01/25/2023 demonstrated drusen. An ICGA from 12/07/2022 demonstrated no polyps. Recommend Avastin. R/B/A discussed with patient. The risks of Avastin were discussed, including off-label use and compounding pharmacy risks, and the patient elects to proceed with Avastin (bilateral). The risks of bilateral injections were discussed, and the patient elects to proceed. After 2% Subconjunctival anesthesia & betadine prep, 1.25mg of Avastin was injected in the eye. Cold compress and Tylenol suggested for pain if needed. 

Return in 1 month OCT OU re eval Nv AMD, possible Avastin OU
Impression: Macular degeneration wet, OS. Status: Symptomatic.
s/p Avastin x 22 last 12/07/2022
s/p Lucentis x last 09/17/2021 Plan: Exam and OCT reveal IRF and a PED OS. An IVFA from 01/25/2023 demonstrated leakage. Fundus photos from 01/25/2023 demonstrated drusen. An ICGA from 12/07/2022 demonstrated a polyp. Recommend Avastin. R/B/A discussed with patient. The risks of Avastin were discussed, including off-label use and compounding pharmacy risks, and the patient elects to proceed with Avastin (bilateral). The risks of bilateral injections were discussed, and the patient elects to proceed. After 2% Subconjunctival anesthesia & betadine prep, 1.25mg of Avastin was injected in the eye. Cold compress and Tylenol suggested for pain if needed.
Impression: PRABHU SIMS. Status: Symptomatic. Plan: AFT's.
Impression: PRABHU TOWNSEND. Status: Chronic. Plan: Stable.
Impression: PVD, OU. Status: Symptomatic. Plan: Peripheral exam reveals no tears. RDW.
independent

## 2023-04-19 ENCOUNTER — OFFICE VISIT (OUTPATIENT)
Dept: URBAN - METROPOLITAN AREA CLINIC 27 | Facility: CLINIC | Age: 68
End: 2023-04-19
Payer: MEDICARE

## 2023-04-19 DIAGNOSIS — H35.3231 EXUDATIVE AGE-REL MCLR DEGN, BI, WITH ACTV CHRDL NEOVAS: Primary | ICD-10-CM

## 2023-04-19 DIAGNOSIS — H43.813 VITREOUS DEGENERATION, BILATERAL: ICD-10-CM

## 2023-04-19 DIAGNOSIS — Z96.1 PRESENCE OF INTRAOCULAR LENS: ICD-10-CM

## 2023-04-19 DIAGNOSIS — H04.123 DRY EYE SYNDROME OF BILATERAL LACRIMAL GLANDS: ICD-10-CM

## 2023-04-19 PROCEDURE — 92134 CPTRZ OPH DX IMG PST SGM RTA: CPT | Performed by: OPHTHALMOLOGY

## 2023-04-19 PROCEDURE — 99214 OFFICE O/P EST MOD 30 MIN: CPT | Performed by: OPHTHALMOLOGY

## 2023-04-19 ASSESSMENT — INTRAOCULAR PRESSURE
OS: 21
OD: 20

## 2023-04-19 NOTE — IMPRESSION/PLAN
Impression: Macular degeneration wet, OS. Status: Symptomatic.
s/p Avastin x 23 last 01/25/2023 
s/p Lucentis x last 09/17/2021 Plan: Exam and OCT reveal IRF and a PED OS. An IVFA from 01/25/2023 demonstrated leakage. Fundus photos from 01/25/2023 demonstrated drusen. An ICGA from 12/07/2022 demonstrated a polyp. Recommend Avastin. R/B/A discussed with patient. The risks of Avastin were discussed, including off-label use and compounding pharmacy risks, and the patient elects to proceed with Avastin (bilateral). The risks of bilateral injections were discussed, and the patient elects to proceed. After 2% Subconjunctival anesthesia & betadine prep, 1.25mg of Avastin was injected in the eye. Cold compress and Tylenol suggested for pain if needed.

## 2023-04-19 NOTE — IMPRESSION/PLAN
Impression: Macular degeneration wet, OD. Status: Symptomatic.
s/p Avastin x 23 last 01/25/2023 
s/p Lucentis x last 09/17/2021 Plan: Exam and OCT reveal SRF and a PED. An IVFA from 01/25/2023 demonstrated leakage. Fundus photos from 01/25/2023 demonstrated drusen. An ICGA from 12/07/2022 demonstrated no polyps. Recommend Avastin. R/B/A discussed with patient. The risks of Avastin were discussed, including off-label use and compounding pharmacy risks, and the patient elects to proceed with Avastin (bilateral). The risks of bilateral injections were discussed, and the patient elects to proceed. After 2% Subconjunctival anesthesia & betadine prep, 1.25mg of Avastin was injected in the eye. Cold compress and Tylenol suggested for pain if needed. AREDS carotenoids Return in 4-6 weeks OCT OU re eval Nv AMD, possible Avastin OU

## 2023-05-17 ENCOUNTER — OFFICE VISIT (OUTPATIENT)
Dept: URBAN - METROPOLITAN AREA CLINIC 27 | Facility: CLINIC | Age: 68
End: 2023-05-17
Payer: MEDICARE

## 2023-05-17 DIAGNOSIS — H43.813 VITREOUS DEGENERATION, BILATERAL: ICD-10-CM

## 2023-05-17 DIAGNOSIS — Z96.1 PRESENCE OF INTRAOCULAR LENS: ICD-10-CM

## 2023-05-17 DIAGNOSIS — H35.3231 EXUDATIVE AGE-REL MCLR DEGN, BI, WITH ACTV CHRDL NEOVAS: Primary | ICD-10-CM

## 2023-05-17 DIAGNOSIS — H04.123 DRY EYE SYNDROME OF BILATERAL LACRIMAL GLANDS: ICD-10-CM

## 2023-05-17 PROCEDURE — 92134 CPTRZ OPH DX IMG PST SGM RTA: CPT | Performed by: OPHTHALMOLOGY

## 2023-05-17 PROCEDURE — 92014 COMPRE OPH EXAM EST PT 1/>: CPT | Performed by: OPHTHALMOLOGY

## 2023-05-17 ASSESSMENT — INTRAOCULAR PRESSURE
OD: 16
OS: 14

## 2023-05-17 NOTE — IMPRESSION/PLAN
Impression: Macular degeneration wet, OD. Status: Symptomatic.
s/p Avastin x 24 last 04/19/2023 
s/p Lucentis x last 09/17/2021 Plan: Exam and OCT reveal IRF and a PED. An IVFA from 01/25/2023 demonstrated leakage. Fundus photos from 01/25/2023 demonstrated drusen. An ICGA from 12/07/2022 demonstrated no polyps. Recommend Avastin. R/B/A discussed with patient. The risks of Avastin were discussed, including off-label use and compounding pharmacy risks, and the patient elects to proceed with Avastin (bilateral). The risks of bilateral injections were discussed, and the patient elects to proceed. After 2% Subconjunctival anesthesia & betadine prep, 1.25mg of Avastin was injected in the eye. Cold compress and Tylenol suggested for pain if needed. AREDS carotenoids Return in 4-6 weeks OCT OU re eval Nv AMD, possible Avastin OU

## 2023-05-17 NOTE — IMPRESSION/PLAN
Impression: Macular degeneration wet, OS. Status: Symptomatic.
s/p Avastin x 24 last 04/19/2023
s/p Lucentis x last 09/17/2021 Plan: Exam and OCT reveal IRF and a PED OS. An IVFA from 01/25/2023 demonstrated leakage. Fundus photos from 01/25/2023 demonstrated drusen. An ICGA from 12/07/2022 demonstrated a polyp. Recommend Avastin. R/B/A discussed with patient. The risks of Avastin were discussed, including off-label use and compounding pharmacy risks, and the patient elects to proceed with Avastin (bilateral). The risks of bilateral injections were discussed, and the patient elects to proceed. After 2% Subconjunctival anesthesia & betadine prep, 1.25mg of Avastin was injected in the eye. Cold compress and Tylenol suggested for pain if needed.